# Patient Record
Sex: FEMALE | Race: WHITE | Employment: FULL TIME | URBAN - METROPOLITAN AREA
[De-identification: names, ages, dates, MRNs, and addresses within clinical notes are randomized per-mention and may not be internally consistent; named-entity substitution may affect disease eponyms.]

---

## 2021-06-03 ENCOUNTER — OFFICE VISIT (OUTPATIENT)
Dept: SURGERY | Facility: CLINIC | Age: 41
End: 2021-06-03
Payer: COMMERCIAL

## 2021-06-03 VITALS
OXYGEN SATURATION: 98 % | WEIGHT: 120 LBS | HEIGHT: 63 IN | HEART RATE: 81 BPM | RESPIRATION RATE: 16 BRPM | DIASTOLIC BLOOD PRESSURE: 68 MMHG | SYSTOLIC BLOOD PRESSURE: 122 MMHG | BODY MASS INDEX: 21.26 KG/M2

## 2021-06-03 DIAGNOSIS — K43.9 VENTRAL HERNIA WITHOUT OBSTRUCTION OR GANGRENE: Primary | ICD-10-CM

## 2021-06-03 PROCEDURE — 99204 OFFICE O/P NEW MOD 45 MIN: CPT | Performed by: SURGERY

## 2021-06-03 RX ORDER — VENLAFAXINE HYDROCHLORIDE 75 MG/1
75 CAPSULE, EXTENDED RELEASE ORAL
COMMUNITY
Start: 2021-04-30

## 2021-06-03 RX ORDER — NITROFURANTOIN MACROCRYSTAL 100 MG
CAPSULE ORAL
COMMUNITY
Start: 2020-06-22

## 2021-06-03 ASSESSMENT — MIFFLIN-ST. JEOR: SCORE: 1188.45

## 2021-06-03 NOTE — LETTER
Krystyna 3, 2021    RE: Britni Barba, : 1980      Corinne is a 40 year old White female who presents for hernia evaluation. The patient has noticed a bulge. Pain has been present. She relates her hernia to her 2 pregnancies.  Symptoms are described as pressure  located in the low epigastric region.  Associated with this  is none.  Pt has had previous ABD surgery including  x2.  Patient does report that increased activity/lifting causes pain. Employment does not require lifting.     Constipation No  Dysuria Intermittent UTIs  Cough No  Smoking No  Diabetes No     Pt's chart has been reviewed for FH,  PMH, PSH, allergies, medications and social history.     ROS:  Pulm:  No shortness of breath, dyspnea on exertion, cough, or hemoptysis  CV:  negative  ABD:  See chief complaint  :  Negative  All other systems negative     Physical exam:  Patient able to get up on table without difficulty.  Head eyes, nose and mouth within normal limits.  Skin - no jaundice  Sclera are clear  No supraclavicular or cervical adenopathy noted.  Neck shows no gross mass  Neurologic: alert, speech is clear, moves all extremities with good strength  Psychiatric: Mood and affect are appropriate  Respirations are regular and non labored  Abdomen is abdomen is soft without significant tenderness, masses, organomegaly or guarding  bowel sounds are positive and no caput medusa noted.  Hernia is present in the lower epigastrium, it is easily reduced. There may also be some degree of diastases present cephalad to this     Imaging  CT No      Assesment: upper ventral wall hernia     Plan: Discussed observation, external support, possible progression, incarceration and strangulation signs and symptoms and need for immediate treatment if they develop.  Discussed surgery in detail, including risk, benefits, complications, incision/cosmetics, mesh, infection (possibly requiring removal of the mesh), chronic pain, involvement of  inta-abdominal organs, lifting and activity limits after surgery. Gave literature to review. She would like to combine the herniorrhaphy with an abdominoplasty. Will schedule surgery in near future, coordinating with plastics.       Phil Worrell MD

## 2021-06-03 NOTE — PROGRESS NOTES
Corinne is a 40 year old White female who presents for hernia evaluation. The patient has noticed a bulge. Pain has been present. She relates her hernia to her 2 pregnancies.  Symptoms are described as pressure  located in the low epigastric region.  Associated with this  is none.  Pt has had previous ABD surgery including  x2.  Patient does report that increased activity/lifting causes pain. Employment does not require lifting.    Constipation No  Dysuria Intermittent UTIs  Cough No  Smoking No  Diabetes No    Pt's chart has been reviewed for FH,  PMH, PSH, allergies, medications and social history.    ROS:  Pulm:  No shortness of breath, dyspnea on exertion, cough, or hemoptysis  CV:  negative  ABD:  See chief complaint  :  Negative  All other systems negative    Physical exam:  Patient able to get up on table without difficulty.  Head eyes, nose and mouth within normal limits.  Skin - no jaundice  Sclera are clear  No supraclavicular or cervical adenopathy noted.  Neck shows no gross mass  Neurologic: alert, speech is clear, moves all extremities with good strength  Psychiatric: Mood and affect are appropriate  Respirations are regular and non labored  Abdomen is abdomen is soft without significant tenderness, masses, organomegaly or guarding  bowel sounds are positive and no caput medusa noted.  Hernia is present in the lower epigastrium, it is easily reduced. There may also be some degree of diastases present cephalad to this    Imaging  CT No      Assesment: upper ventral wall hernia    Plan: Discussed observation, external support, possible progression, incarceration and strangulation signs and symptoms and need for immediate treatment if they develop.  Discussed surgery in detail, including risk, benefits, complications, incision/cosmetics, mesh, infection (possibly requiring removal of the mesh), chronic pain, involvement of inta-abdominal organs, lifting and activity limits after surgery. Gave  literature to review. She would like to combine the herniorrhaphy with an abdominoplasty. Will schedule surgery in near future, coordinating with plastics.    30   minutes spent on the date of the encounter in chart review, review of test results, patient visit, physical exam, education, counseling, development of care plan and documentation.    Phil Worrell MD  6/3/2021 3:30 PM    Please route or send letter to:  Primary Care Provider (PCP) and Include Progress Note    Level 4

## 2021-11-11 ENCOUNTER — TRANSFERRED RECORDS (OUTPATIENT)
Dept: SURGERY | Facility: CLINIC | Age: 41
End: 2021-11-11

## 2021-11-11 ENCOUNTER — APPOINTMENT (OUTPATIENT)
Dept: SURGERY | Facility: PHYSICIAN GROUP | Age: 41
End: 2021-11-11
Payer: COMMERCIAL

## 2021-11-11 PROCEDURE — 49568 PR REPAIR HERNIA WITH MESH: CPT | Performed by: SURGERY

## 2021-11-11 PROCEDURE — 49560 PR REPAIR INCISIONAL HERNIA,REDUCIBLE: CPT | Performed by: SURGERY
